# Patient Record
Sex: FEMALE | Race: WHITE | NOT HISPANIC OR LATINO | URBAN - NONMETROPOLITAN AREA
[De-identification: names, ages, dates, MRNs, and addresses within clinical notes are randomized per-mention and may not be internally consistent; named-entity substitution may affect disease eponyms.]

---

## 2019-04-26 ENCOUNTER — IMPORTED ENCOUNTER (OUTPATIENT)
Dept: URBAN - NONMETROPOLITAN AREA CLINIC 1 | Facility: CLINIC | Age: 74
End: 2019-04-26

## 2019-04-26 PROBLEM — H25.813: Noted: 2019-04-26

## 2019-04-26 PROCEDURE — 92004 COMPRE OPH EXAM NEW PT 1/>: CPT

## 2019-04-26 NOTE — PATIENT DISCUSSION
Cataract OU-Visually significant cataract OU.-Cataract(s) causing symptomatic impairment of visual function not correctable with a tolerable change in glasses or contact lenses lighting or non-operative means resulting in specific activity limitations and/or participation restrictions including but not limited to reading viewing television driving or meeting vocational or recreational needs. -Expectation is clearer vision and functional improvement in symptoms as well as reduced glare disability after cataract removal.-Order IOLMaster and OPD today. -Recommend Standard/traditional OU based on today's OPD testing and lifestyle questionnaire.-All questions were answered regarding surgery including pre and post-op medications appointments activity restrictions and anesthetic usage.-The risks benefits and alternatives and special risk factors for the patient were discussed in detail including but not limited to: bleeding infection retinal detachment vitreous loss problems with the implant and possible need for additional surgery.-Although rare the possibility of complete vision loss was discussed.-The possible need for glasses post-operatively was discussed.-Order medical clearance exam based on history of Fibromyalgia hypertension amd thyroid problems-Patient elects to proceed with cataract surgery OD. Will schedule at patient's convenience and re-evaluate OS in the future.

## 2019-05-02 ENCOUNTER — IMPORTED ENCOUNTER (OUTPATIENT)
Dept: URBAN - NONMETROPOLITAN AREA CLINIC 1 | Facility: CLINIC | Age: 74
End: 2019-05-02

## 2019-05-02 PROBLEM — E07.9: Noted: 2019-05-02

## 2019-05-02 PROBLEM — I10: Noted: 2019-05-02

## 2019-05-02 PROBLEM — Z01.818: Noted: 2019-05-02

## 2019-05-02 PROBLEM — M79.7: Noted: 2019-05-02

## 2019-05-22 ENCOUNTER — IMPORTED ENCOUNTER (OUTPATIENT)
Dept: URBAN - NONMETROPOLITAN AREA CLINIC 1 | Facility: CLINIC | Age: 74
End: 2019-05-22

## 2019-05-22 PROCEDURE — 92012 INTRM OPH EXAM EST PATIENT: CPT

## 2019-05-22 NOTE — PATIENT DISCUSSION
No outstanding medical concerns and patient is cleared for surgeryCataract OS-Visually significant cataract OS. -Cataract causing symptomatic impairment of visual function not correctable with a tolerable change in glasses or contact lenses lighting or non-operative means resulting in specific activity limitations and/or participation restrictions including but not limited to reading viewing television driving or meeting vocational or recreational needs. -Expectation is clearer vision and functional improvement in symptoms as well as reduced glare disability after cataract removal.-Recommend standard/traditional based on previous OPD testing and lifestyle questionnaire.-All questions were answered regarding surgery including pre and post-op medications appointments activity restrictions and anesthetic usage.-The risks benefits and alternatives and special risk factors for the patient were discussed in detail including but not limited to: bleeding infection retinal detachment vitreous loss problems with the implant and possible need for additional surgery.-Although rare the possibility of complete vision loss was discussed.-The need for glasses post-operatively was discussed.-Patient elects to proceed with cataract surgery OS. Will schedule at patient's convenience. 1 week s/p PCIOL OD-Pt doing well at 1 week s/p PCIOL. -Continue post-op gtts according to instruction sheet.-Okay to resume usual activites and d/c eye shield.

## 2019-05-24 PROBLEM — Z98.41: Noted: 2019-05-22

## 2019-05-24 PROBLEM — E07.9: Noted: 2019-05-24

## 2019-05-24 PROBLEM — Z01.818: Noted: 2019-05-24

## 2019-05-24 PROBLEM — H25.812: Noted: 2019-05-24

## 2019-05-24 PROBLEM — M79.7: Noted: 2019-05-24

## 2019-05-24 PROBLEM — I10: Noted: 2019-05-24

## 2021-11-04 NOTE — PATIENT DISCUSSION
WARM COMPRESSES.  PT COUNSELED ON DERM MASK USE. WARM COMPRESS INSTRUCTION SHEET GONE OVER WITH PT UPON CHECK OUT.

## 2022-04-10 ASSESSMENT — VISUAL ACUITY
OD_CC: J7
OS_SC: 20/100
OD_PH: 20/40
OD_SC: 20/100
OS_GLARE: 20/400
OS_PH: 20/40
OD_PAM: 20/25
OD_PH: 20/40
OD_GLARE: 20/400
OS_GLARE: 20/400
OS_AM: 20/25
OS_PH: 20/40
OD_CC: 20/30
OD_PAM: 20/25
OS_CC: 20/100
OS_CC: J7
OS_AM: 20/25
OD_SC: 20/100

## 2022-04-10 ASSESSMENT — TONOMETRY
OS_IOP_MMHG: 10
OS_IOP_MMHG: 16
OD_IOP_MMHG: 11
OD_IOP_MMHG: 16
